# Patient Record
(demographics unavailable — no encounter records)

---

## 2025-02-26 NOTE — HISTORY OF PRESENT ILLNESS
[FreeTextEntry1] : Patient is a 27 year old male who has been seen by ENT Dr. Pavon. 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 Patient currently uses [employer] CROS system as needed, per mother. 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 Patient has been medically cleared for new hearing aids.

## 2025-04-16 NOTE — HISTORY OF PRESENT ILLNESS
[FreeTextEntry1] : Patient is a 27 year old male who has been seen by ENT Dr. Pavon. 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 Patient currently uses [employer] CROS system as needed, per mother. 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 Patient has been medically cleared for new hearing aids